# Patient Record
Sex: FEMALE | Race: WHITE | NOT HISPANIC OR LATINO
[De-identification: names, ages, dates, MRNs, and addresses within clinical notes are randomized per-mention and may not be internally consistent; named-entity substitution may affect disease eponyms.]

---

## 2017-02-13 ENCOUNTER — APPOINTMENT (OUTPATIENT)
Dept: SURGERY | Facility: CLINIC | Age: 52
End: 2017-02-13

## 2017-08-10 ENCOUNTER — EMERGENCY (EMERGENCY)
Facility: HOSPITAL | Age: 52
LOS: 1 days | Discharge: HOME | End: 2017-08-10
Attending: EMERGENCY MEDICINE | Admitting: EMERGENCY MEDICINE
Payer: COMMERCIAL

## 2017-08-10 VITALS
TEMPERATURE: 98 F | OXYGEN SATURATION: 98 % | WEIGHT: 199.96 LBS | DIASTOLIC BLOOD PRESSURE: 84 MMHG | SYSTOLIC BLOOD PRESSURE: 116 MMHG | HEIGHT: 62 IN | RESPIRATION RATE: 16 BRPM | HEART RATE: 98 BPM

## 2017-08-10 PROCEDURE — 12001 RPR S/N/AX/GEN/TRNK 2.5CM/<: CPT

## 2017-08-10 PROCEDURE — 99282 EMERGENCY DEPT VISIT SF MDM: CPT | Mod: 25

## 2017-08-10 NOTE — ED ADULT TRIAGE NOTE - CHIEF COMPLAINT QUOTE
I was walking and fell. I noticed I was bleeding from my foot. Lac noted to to under the pinky on left foot. my left knee hurts.

## 2017-08-11 NOTE — ED PROVIDER NOTE - OBJECTIVE STATEMENT
52 yr old F presented to ED with laceration to left toe. Pt states that she was walking and stub her toe on the  side walk. Pt explained that she injured her toe and fell to the ground. Pt denies hitting her head . Pt admits to abrasion to left knee and elbow. Pr denies any other complaints at this time.

## 2017-08-11 NOTE — ED PROVIDER NOTE - PHYSICAL EXAMINATION
Left elbow + Abrasion , Non bleeding . Normal ROM at elbow with no tenderness noted.  Left Knee + Abrasion , Non bleeding . Normal ROM noted. No tenderness on palpation. No edema noted.

## 2017-08-11 NOTE — ED PROVIDER NOTE - CARE PLAN
Principal Discharge DX:	Toe laceration Principal Discharge DX:	Toe laceration  Instructions for follow-up, activity and diet:	Wound care information covered and Pt will F/U for suture removal  Secondary Diagnosis:	Abrasion

## 2017-08-11 NOTE — ED PROVIDER NOTE - PROGRESS NOTE DETAILS
Abrasion cleaned with Normal saline and Bacitracin applied . Laceration to Left 5th toe planter region. Normal ROM to all toes. Laceration 1.5cm . + bleeding noted. Laceration repaired and Pt tolerated well.

## 2017-08-11 NOTE — ED PROVIDER NOTE - MEDICAL DECISION MAKING DETAILS
52 yr old F presented to ED with abrasion to L elbow and L knee.  Laceration to left 5th toe. Pt stated that she stub her toe and fell . Pt denies hitting her head but Pt admits to abrasion to Left elbow and left knee. Laceration to 5 th toe. Abrasion cleaned and bacitracin applied. Laceration to left 5th toe sutured and pt tolerated will. F/U with PCP or return to ED for suture removal as discussed. Pt D/C in hemodynamically  stable condition.

## 2020-01-30 ENCOUNTER — EMERGENCY (EMERGENCY)
Facility: HOSPITAL | Age: 55
LOS: 1 days | Discharge: HOME | End: 2020-01-30
Attending: STUDENT IN AN ORGANIZED HEALTH CARE EDUCATION/TRAINING PROGRAM
Payer: COMMERCIAL

## 2020-01-30 VITALS
DIASTOLIC BLOOD PRESSURE: 72 MMHG | HEIGHT: 62 IN | SYSTOLIC BLOOD PRESSURE: 113 MMHG | OXYGEN SATURATION: 96 % | WEIGHT: 199.96 LBS | HEART RATE: 72 BPM | RESPIRATION RATE: 18 BRPM | TEMPERATURE: 98 F

## 2020-01-30 PROCEDURE — 99284 EMERGENCY DEPT VISIT MOD MDM: CPT

## 2020-01-30 RX ORDER — ONDANSETRON 8 MG/1
1 TABLET, FILM COATED ORAL
Qty: 30 | Refills: 0
Start: 2020-01-30 | End: 2020-02-08

## 2020-01-30 RX ORDER — DIAZEPAM 5 MG
5 TABLET ORAL ONCE
Refills: 0 | Status: DISCONTINUED | OUTPATIENT
Start: 2020-01-30 | End: 2020-01-30

## 2020-01-30 RX ORDER — IBUPROFEN 200 MG
1 TABLET ORAL
Qty: 30 | Refills: 0
Start: 2020-01-30 | End: 2020-02-08

## 2020-01-30 RX ORDER — IBUPROFEN 200 MG
600 TABLET ORAL ONCE
Refills: 0 | Status: COMPLETED | OUTPATIENT
Start: 2020-01-30 | End: 2020-01-30

## 2020-01-30 RX ORDER — LIDOCAINE 4 G/100G
1 CREAM TOPICAL ONCE
Refills: 0 | Status: COMPLETED | OUTPATIENT
Start: 2020-01-30 | End: 2020-01-30

## 2020-01-30 RX ORDER — ONDANSETRON 8 MG/1
4 TABLET, FILM COATED ORAL ONCE
Refills: 0 | Status: COMPLETED | OUTPATIENT
Start: 2020-01-30 | End: 2020-01-30

## 2020-01-30 RX ORDER — METHOCARBAMOL 500 MG/1
2 TABLET, FILM COATED ORAL
Qty: 30 | Refills: 0
Start: 2020-01-30 | End: 2020-02-03

## 2020-01-30 RX ADMIN — Medication 40 MILLIGRAM(S): at 18:01

## 2020-01-30 RX ADMIN — LIDOCAINE 1 PATCH: 4 CREAM TOPICAL at 18:03

## 2020-01-30 RX ADMIN — ONDANSETRON 4 MILLIGRAM(S): 8 TABLET, FILM COATED ORAL at 17:59

## 2020-01-30 RX ADMIN — Medication 600 MILLIGRAM(S): at 18:02

## 2020-01-30 RX ADMIN — Medication 5 MILLIGRAM(S): at 18:02
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
## 2020-01-30 NOTE — ED PROVIDER NOTE - MUSCULOSKELETAL, MLM
Spine appears normal, no midline pt vertebral or step off. + cervical paraspinal muscle and lumbosacral paraspinal muscle tenderness bilaterally. 5/5 muscle strength bilateral upper and lower extremities. sensation intact bilaterally .

## 2020-01-30 NOTE — ED PROVIDER NOTE - PATIENT PORTAL LINK FT
You can access the FollowMyHealth Patient Portal offered by [facility] by registering at the following website: [email or web address]. By joining [employer]s FollowMyHealth portal, you will also be able to view your health information using other applications (apps) compatible with our system.
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
## 2020-01-30 NOTE — ED PROVIDER NOTE - CARDIAC, MLM
Normal rate, regular rhythm.  Heart sounds S1, S2.  No murmurs, rubs or gallops. no chest wall bruising no chest wall tenderness.
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
## 2022-04-04 NOTE — ED ADULT TRIAGE NOTE - HEIGHT IN CM
157.48 Azathioprine Counseling:  I discussed with the patient the risks of azathioprine including but not limited to myelosuppression, immunosuppression, hepatotoxicity, lymphoma, and infections.  The patient understands that monitoring is required including baseline LFTs, Creatinine, possible TPMP genotyping and weekly CBCs for the first month and then every 2 weeks thereafter.  The patient verbalized understanding of the proper use and possible adverse effects of azathioprine.  All of the patient's questions and concerns were addressed.

## 2022-05-23 NOTE — ED ADULT TRIAGE NOTE - MEANS OF ARRIVAL
ambulatory FAMILY HISTORY:  Father  Still living? No  Family history of thyroid cancer, Age at diagnosis: Age Unknown    Mother  Still living? No  Family history of lung cancer, Age at diagnosis: Age Unknown    Sibling  Still living? Yes, Estimated age: Age Unknown  Family history of breast cancer, Age at diagnosis: Age Unknown

## 2024-12-27 ENCOUNTER — NON-APPOINTMENT (OUTPATIENT)
Age: 59
End: 2024-12-27

## 2025-07-16 ENCOUNTER — APPOINTMENT (OUTPATIENT)
Dept: ORTHOPEDIC SURGERY | Facility: CLINIC | Age: 60
End: 2025-07-16